# Patient Record
Sex: FEMALE | Race: WHITE
[De-identification: names, ages, dates, MRNs, and addresses within clinical notes are randomized per-mention and may not be internally consistent; named-entity substitution may affect disease eponyms.]

---

## 2020-07-18 ENCOUNTER — HOSPITAL ENCOUNTER (INPATIENT)
Dept: HOSPITAL 47 - EC | Age: 55
LOS: 1 days | Discharge: HOME | DRG: 200 | End: 2020-07-19
Attending: SURGERY | Admitting: SURGERY
Payer: COMMERCIAL

## 2020-07-18 DIAGNOSIS — S27.0XXA: Primary | ICD-10-CM

## 2020-07-18 DIAGNOSIS — R07.89: ICD-10-CM

## 2020-07-18 DIAGNOSIS — M54.2: ICD-10-CM

## 2020-07-18 DIAGNOSIS — F10.129: ICD-10-CM

## 2020-07-18 DIAGNOSIS — Z20.828: ICD-10-CM

## 2020-07-18 DIAGNOSIS — Z79.899: ICD-10-CM

## 2020-07-18 DIAGNOSIS — R10.9: ICD-10-CM

## 2020-07-18 DIAGNOSIS — F17.200: ICD-10-CM

## 2020-07-18 DIAGNOSIS — Z98.51: ICD-10-CM

## 2020-07-18 DIAGNOSIS — S22.42XA: ICD-10-CM
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
## 2020-07-18 LAB
ALBUMIN SERPL-MCNC: 5.3 G/DL (ref 3.5–5)
ALP SERPL-CCNC: 66 U/L (ref 38–126)
ALT SERPL-CCNC: 15 U/L (ref 4–34)
AMYLASE SERPL-CCNC: 35 U/L (ref 30–110)
ANION GAP SERPL CALC-SCNC: 11 MMOL/L
APTT BLD: 22.1 SEC (ref 22–30)
AST SERPL-CCNC: 34 U/L (ref 14–36)
BASOPHILS # BLD AUTO: 0 K/UL (ref 0–0.2)
BASOPHILS NFR BLD AUTO: 0 %
BUN SERPL-SCNC: 6 MG/DL (ref 7–17)
CALCIUM SPEC-MCNC: 9.2 MG/DL (ref 8.4–10.2)
CHLORIDE SERPL-SCNC: 100 MMOL/L (ref 98–107)
CK SERPL-CCNC: 315 U/L (ref 30–135)
CO2 SERPL-SCNC: 24 MMOL/L (ref 22–30)
EOSINOPHIL # BLD AUTO: 0.1 K/UL (ref 0–0.7)
EOSINOPHIL NFR BLD AUTO: 1 %
ERYTHROCYTE [DISTWIDTH] IN BLOOD BY AUTOMATED COUNT: 4.36 M/UL (ref 3.8–5.4)
ERYTHROCYTE [DISTWIDTH] IN BLOOD: 12.8 % (ref 11.5–15.5)
GLUCOSE BLD-MCNC: 111 MG/DL (ref 75–99)
GLUCOSE BLD-MCNC: 99 MG/DL (ref 75–99)
GLUCOSE SERPL-MCNC: 106 MG/DL (ref 74–99)
HCT VFR BLD AUTO: 43.9 % (ref 34–46)
HGB BLD-MCNC: 14.2 GM/DL (ref 11.4–16)
INR PPP: 0.9 (ref ?–1.2)
LYMPHOCYTES # SPEC AUTO: 1 K/UL (ref 1–4.8)
LYMPHOCYTES NFR SPEC AUTO: 10 %
MCH RBC QN AUTO: 32.6 PG (ref 25–35)
MCHC RBC AUTO-ENTMCNC: 32.4 G/DL (ref 31–37)
MCV RBC AUTO: 100.7 FL (ref 80–100)
MONOCYTES # BLD AUTO: 0.4 K/UL (ref 0–1)
MONOCYTES NFR BLD AUTO: 4 %
NEUTROPHILS # BLD AUTO: 8.4 K/UL (ref 1.3–7.7)
NEUTROPHILS NFR BLD AUTO: 84 %
PLATELET # BLD AUTO: 248 K/UL (ref 150–450)
POTASSIUM SERPL-SCNC: 4 MMOL/L (ref 3.5–5.1)
PROT SERPL-MCNC: 8.2 G/DL (ref 6.3–8.2)
PT BLD: 9.6 SEC (ref 9–12)
SODIUM SERPL-SCNC: 135 MMOL/L (ref 137–145)
TROPONIN I SERPL-MCNC: <0.012 NG/ML (ref 0–0.03)
WBC # BLD AUTO: 9.9 K/UL (ref 3.8–10.6)

## 2020-07-18 PROCEDURE — 93005 ELECTROCARDIOGRAM TRACING: CPT

## 2020-07-18 PROCEDURE — 82150 ASSAY OF AMYLASE: CPT

## 2020-07-18 PROCEDURE — 96376 TX/PRO/DX INJ SAME DRUG ADON: CPT

## 2020-07-18 PROCEDURE — 80320 DRUG SCREEN QUANTALCOHOLS: CPT

## 2020-07-18 PROCEDURE — 80053 COMPREHEN METABOLIC PANEL: CPT

## 2020-07-18 PROCEDURE — 99285 EMERGENCY DEPT VISIT HI MDM: CPT

## 2020-07-18 PROCEDURE — 96375 TX/PRO/DX INJ NEW DRUG ADDON: CPT

## 2020-07-18 PROCEDURE — 32551 INSERTION OF CHEST TUBE: CPT

## 2020-07-18 PROCEDURE — 96361 HYDRATE IV INFUSION ADD-ON: CPT

## 2020-07-18 PROCEDURE — 84484 ASSAY OF TROPONIN QUANT: CPT

## 2020-07-18 PROCEDURE — 86900 BLOOD TYPING SEROLOGIC ABO: CPT

## 2020-07-18 PROCEDURE — 82553 CREATINE MB FRACTION: CPT

## 2020-07-18 PROCEDURE — 70450 CT HEAD/BRAIN W/O DYE: CPT

## 2020-07-18 PROCEDURE — 85025 COMPLETE CBC W/AUTO DIFF WBC: CPT

## 2020-07-18 PROCEDURE — 83605 ASSAY OF LACTIC ACID: CPT

## 2020-07-18 PROCEDURE — 86901 BLOOD TYPING SEROLOGIC RH(D): CPT

## 2020-07-18 PROCEDURE — 85610 PROTHROMBIN TIME: CPT

## 2020-07-18 PROCEDURE — 82550 ASSAY OF CK (CPK): CPT

## 2020-07-18 PROCEDURE — 71045 X-RAY EXAM CHEST 1 VIEW: CPT

## 2020-07-18 PROCEDURE — 0W9B30Z DRAINAGE OF LEFT PLEURAL CAVITY WITH DRAINAGE DEVICE, PERCUTANEOUS APPROACH: ICD-10-PCS

## 2020-07-18 PROCEDURE — 72125 CT NECK SPINE W/O DYE: CPT

## 2020-07-18 PROCEDURE — 80048 BASIC METABOLIC PNL TOTAL CA: CPT

## 2020-07-18 PROCEDURE — 72170 X-RAY EXAM OF PELVIS: CPT

## 2020-07-18 PROCEDURE — 74177 CT ABD & PELVIS W/CONTRAST: CPT

## 2020-07-18 PROCEDURE — 83690 ASSAY OF LIPASE: CPT

## 2020-07-18 PROCEDURE — 71260 CT THORAX DX C+: CPT

## 2020-07-18 PROCEDURE — 86850 RBC ANTIBODY SCREEN: CPT

## 2020-07-18 PROCEDURE — 36415 COLL VENOUS BLD VENIPUNCTURE: CPT

## 2020-07-18 PROCEDURE — 96374 THER/PROPH/DIAG INJ IV PUSH: CPT

## 2020-07-18 PROCEDURE — 85730 THROMBOPLASTIN TIME PARTIAL: CPT

## 2020-07-18 RX ADMIN — HYDROMORPHONE HYDROCHLORIDE PRN MG: 1 INJECTION, SOLUTION INTRAMUSCULAR; INTRAVENOUS; SUBCUTANEOUS at 21:48

## 2020-07-18 RX ADMIN — KETOROLAC TROMETHAMINE SCH MG: 30 INJECTION, SOLUTION INTRAMUSCULAR at 18:05

## 2020-07-18 RX ADMIN — HYDROMORPHONE HYDROCHLORIDE PRN MG: 1 INJECTION, SOLUTION INTRAMUSCULAR; INTRAVENOUS; SUBCUTANEOUS at 09:45

## 2020-07-18 RX ADMIN — HYDROCODONE BITARTRATE AND ACETAMINOPHEN PRN EACH: 5; 325 TABLET ORAL at 11:26

## 2020-07-18 RX ADMIN — HYDROMORPHONE HYDROCHLORIDE PRN MG: 1 INJECTION, SOLUTION INTRAMUSCULAR; INTRAVENOUS; SUBCUTANEOUS at 14:22

## 2020-07-18 NOTE — XR
EXAMINATION TYPE: XR chest 1V

 

DATE OF EXAM: 7/18/2020

 

HISTORY: Chest tube.

 

REFERENCE: Previous study dated 7/18/2020.

 

FINDINGS: A left pleural drain has been inserted. Residual pneumothorax is not identified. There is s
ome atelectatic change present at the left lung base. The right lung is clear. The heart is not enlar
ged. I could not exclude a tiny left effusion.

 

IMPRESSION: 

 

LEFT-SIDED PNEUMOTHORAX APPEARS TO HAVE RESOLVED.
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
## 2020-07-18 NOTE — XR
EXAMINATION TYPE: XR pelvis AP view ,

 

DATE OF EXAM ORDERED: 7/18/2020

 

HISTORY: Trauma.

 

COMPARISON: None.

 

FINDINGS:  Osseous structures about the pelvis are normal. No fracture is seen. There are mild degene
rative changes within the hips.

 

IMPRESSION: 

 

NO ACUTE OSSEOUS LESION.
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
## 2020-07-19 VITALS — RESPIRATION RATE: 28 BRPM | DIASTOLIC BLOOD PRESSURE: 72 MMHG | SYSTOLIC BLOOD PRESSURE: 101 MMHG | HEART RATE: 75 BPM

## 2020-07-19 VITALS — TEMPERATURE: 98.5 F

## 2020-07-19 LAB
ANION GAP SERPL CALC-SCNC: 5 MMOL/L
BASOPHILS # BLD AUTO: 0 K/UL (ref 0–0.2)
BASOPHILS NFR BLD AUTO: 0 %
BUN SERPL-SCNC: 8 MG/DL (ref 7–17)
CALCIUM SPEC-MCNC: 8.9 MG/DL (ref 8.4–10.2)
CHLORIDE SERPL-SCNC: 100 MMOL/L (ref 98–107)
CO2 SERPL-SCNC: 24 MMOL/L (ref 22–30)
EOSINOPHIL # BLD AUTO: 0.1 K/UL (ref 0–0.7)
EOSINOPHIL NFR BLD AUTO: 2 %
ERYTHROCYTE [DISTWIDTH] IN BLOOD BY AUTOMATED COUNT: 3.8 M/UL (ref 3.8–5.4)
ERYTHROCYTE [DISTWIDTH] IN BLOOD: 12.7 % (ref 11.5–15.5)
GLUCOSE SERPL-MCNC: 87 MG/DL (ref 74–99)
HCT VFR BLD AUTO: 39.2 % (ref 34–46)
HGB BLD-MCNC: 12.3 GM/DL (ref 11.4–16)
LYMPHOCYTES # SPEC AUTO: 1.1 K/UL (ref 1–4.8)
LYMPHOCYTES NFR SPEC AUTO: 24 %
MCH RBC QN AUTO: 32.3 PG (ref 25–35)
MCHC RBC AUTO-ENTMCNC: 31.3 G/DL (ref 31–37)
MCV RBC AUTO: 102.9 FL (ref 80–100)
MONOCYTES # BLD AUTO: 0.4 K/UL (ref 0–1)
MONOCYTES NFR BLD AUTO: 9 %
NEUTROPHILS # BLD AUTO: 3 K/UL (ref 1.3–7.7)
NEUTROPHILS NFR BLD AUTO: 63 %
PLATELET # BLD AUTO: 171 K/UL (ref 150–450)
POTASSIUM SERPL-SCNC: 4.3 MMOL/L (ref 3.5–5.1)
SODIUM SERPL-SCNC: 129 MMOL/L (ref 137–145)
WBC # BLD AUTO: 4.7 K/UL (ref 3.8–10.6)

## 2020-07-19 RX ADMIN — HYDROCODONE BITARTRATE AND ACETAMINOPHEN PRN EACH: 5; 325 TABLET ORAL at 12:47

## 2020-07-19 RX ADMIN — KETOROLAC TROMETHAMINE SCH MG: 30 INJECTION, SOLUTION INTRAMUSCULAR at 02:00

## 2020-07-19 RX ADMIN — KETOROLAC TROMETHAMINE SCH MG: 30 INJECTION, SOLUTION INTRAMUSCULAR at 06:50

## 2020-07-19 RX ADMIN — KETOROLAC TROMETHAMINE SCH: 30 INJECTION, SOLUTION INTRAMUSCULAR at 12:47

## 2020-07-19 RX ADMIN — HYDROCODONE BITARTRATE AND ACETAMINOPHEN PRN EACH: 5; 325 TABLET ORAL at 05:12

## 2020-07-19 NOTE — XR
EXAMINATION TYPE: XR chest 1V

 

DATE OF EXAM: 7/19/2020

 

HISTORY: pneumo.

 

REFERENCE: Previous study dated 7/18/2020.

 

FINDINGS: A left pleural drain remains in place. A definite pneumothorax is not seen at this time. Th
ere has developed platelike atelectasis at the left lung base. Right lung is clear. The heart is not 
enlarged. No definite pleural fluid is seen.

 

IMPRESSION: 

1. NO DEFINITE PNEUMOTHORAX AT THIS TIME.

2. PLATELIKE ATELECTASIS, LEFT LUNG BASE.

## 2020-07-19 NOTE — P.PN
Progress Note - Text


Progress Note Date: 07/19/20


The patient feels well.  Her pneumothorax is improved.





On exam vital signs are stable.  Chest is clear abdomen soft.  Her patiently 

discharged home today.  She'll follow-up with Dr. Beltran for removal of 

thoravent.

## 2020-07-19 NOTE — P.PN
Subjective


Progress Note Date: 07/19/20





On 07/19/2020, the patient is doing well and the patient has no specific 

complaints.  The Thoravent is attached to her pleural VAC and there is no 

evidence of any air leak.  The follow-up chest x-ray from today shows expansion 

of the left lung without any major issues.  No evidence of any pneumothorax.  T

he patient is not having any respiratory difficulty speech is using incentive 

spirometer.  Chest wall pain is under good control.  Blood work essentially 

within normal limits in terms of her CBC.  Sodium is slightly low at 129.  No 

signs of any delirium tremens.  Hemodynamics is stable.  No hemoptysis.  No 

altered mentation.  No other complaints otherwise.  The patient is emanating the

patient has no complaints.





Objective





- Vital Signs


Vital signs: 


                                   Vital Signs











Temp  98.5 F   07/19/20 04:00


 


Pulse  67   07/19/20 10:00


 


Resp  12   07/19/20 10:00


 


BP  111/81   07/19/20 09:00


 


Pulse Ox  97   07/19/20 10:00








                                 Intake & Output











 07/18/20 07/19/20 07/19/20





 18:59 06:59 18:59


 


Intake Total 1080 1250 360


 


Output Total 0 2000 0


 


Balance 1080 -750 360


 


Weight 56.699 kg 60.7 kg 


 


Intake:   


 


  Oral 1080 1250 360


 


Output:   


 


  Urine 0 2000 0


 


Other:   


 


  Voiding Method  Bedside Commode Bedside Commode


 


  # Voids  1 1














- Exam





The patient appeared well nourished and normally developed. Vital signs as 

documented. Head exam is unremarkable. No scleral icterus or corneal arcus note

d. Neck is without jugular venous distension, thyromegaly, or carotid bruits. 

Carotid upstrokes are brisk bilaterally. Lungs are clear to auscultation and 

percussion.  The patient has symmetrical breath sounds bilaterally and the 

patient has a Thoravent over the left anterior chest area which is well-

positioned and well-dressed.  Cardiac exam reveals the PMI to be normally sized 

and situated. Rhythm is regular. First and second heart sounds normal. No 

murmurs, rubs or gallops. Abdominal exam reveals normal bowel sounds, no masses,

no organomegaly and no aortic enlargement. Extremities are nonedematous and both

femoral and pedal pulses are normal.





- Labs


CBC & Chem 7: 


                                 07/19/20 04:44





                                 07/19/20 04:44


Labs: 


                  Abnormal Lab Results - Last 24 Hours (Table)











  07/19/20 07/19/20 Range/Units





  04:44 04:44 


 


MCV  102.9 H   (80.0-100.0)  fL


 


Sodium   129 L  (137-145)  mmol/L


 


Creatinine   0.39 L  (0.52-1.04)  mg/dL














Assessment and Plan


Plan: 











1 traumatic left-sided pneumothorax post Thoravent insertion with excellent 

reexpansion of the left lung.  The patient continues to have the Thoravent in 

place.  Repeat chest x-ray from today shows no evidence of any pneumothorax.  No

evidence of any air leak and the Thoravent was taken off the infection.





2 nondisplaced rib fractures fifth and sixth on the left





3 chest wall pain secondary to above





4 fall





5 smoker





Plan





Norco for pain control


Provide the patient incentive spirometer 


I do not object for discharge today and the patient can go home along with the 

Thoravent.  She can see me tomorrow or Tuesday in the office for repeat chest x-

ray in the office and possibly removal of the Thoravent tube..  His hematocrit 

is stable.  I think she should be able to get discharged.  I discussed this with

the surgeon.

## 2020-09-04 ENCOUNTER — HOSPITAL ENCOUNTER (OUTPATIENT)
Dept: HOSPITAL 47 - RADMAMWWP | Age: 55
Discharge: HOME | End: 2020-09-04
Attending: FAMILY MEDICINE
Payer: COMMERCIAL

## 2020-09-04 DIAGNOSIS — Z12.31: Primary | ICD-10-CM

## 2020-09-04 PROCEDURE — 77067 SCR MAMMO BI INCL CAD: CPT

## 2020-09-08 NOTE — MM
Reason for exam: screening  (asymptomatic).



History:

Patient is postmenopausal.

Family history of breast cancer in paternal aunt.

Benign excisional biopsy of the right breast, 1995.  Benign excisional biopsy of 

the left breast.



Physical Findings:

A clinical breast exam by your physician is recommended on an annual basis and 

results should be correlated with mammographic findings.



MG Screening Mammo w CAD

Bilateral CC and MLO view(s) were taken.

No prior studies available for comparison.

The breast tissue is heterogeneously dense. This may lower the sensitivity of 

mammography.

Finding: There are intermediate concern, suspicious grouped/clustered 

calcifications in both breasts.





ASSESSMENT: Incomplete: need additional imaging evaluation, BI-RAD 0



RECOMMENDATION:

Special view mammogram of both breasts.



[facility] will attempt to contact patient to return for supplemental 

views.

## 2020-09-10 ENCOUNTER — HOSPITAL ENCOUNTER (OUTPATIENT)
Dept: HOSPITAL 47 - RADMAMWWP | Age: 55
Discharge: HOME | End: 2020-09-10
Attending: FAMILY MEDICINE
Payer: COMMERCIAL

## 2020-09-10 DIAGNOSIS — R92.8: Primary | ICD-10-CM

## 2020-09-10 PROCEDURE — 77066 DX MAMMO INCL CAD BI: CPT

## 2020-09-10 NOTE — MM
Reason for exam: additional evaluation requested from abnormal screening.

Last mammogram was performed less than 1 month ago.



History:

Patient is postmenopausal.

Family history of breast cancer.

Benign stereotactic core biopsy of the left breast, January 9, 2003.  Benign 

excisional biopsy of the right breast, 1995.  Core biopsy of the left breast.  

Benign excisional biopsy of the left breast.



Physical Findings:

Nurse did not find any significant physical abnormalities on exam.



MG Work Up Mamm w CAD BILAT

Bilateral CC with magnification, MLO with magnification, and LM view(s) were 

taken.

Prior study comparison: September 4, 2020, bilateral MG screening mammo w CAD.

The breast tissue is heterogeneously dense. This may lower the sensitivity of 

mammography.  Indeterminate group of microcalcifications outer lower right breast 

6cm from nipple and upper outer left breast 4cm from nipple.



These results were verbally communicated with the patient and result sheet given 

to the patient on 9/10/20.





ASSESSMENT: Suspicious, BI-RAD 4



RECOMMENDATION:

Stereotactic core biopsy of both breasts.



Called Dr. Wakefield's office with mammographic findings and has scheduled an 

appointment for the patient for 9/24/20 at 12:00 with Dr. Goss.

Biopsy scheduled for 9/25/20 at 8:00.



PRELIMINARY REPORT CALLED AND FAXED TO DR. GOSS ON 9/10/20.

## 2020-09-24 ENCOUNTER — HOSPITAL ENCOUNTER (OUTPATIENT)
Dept: HOSPITAL 47 - WWCWWP | Age: 55
Discharge: HOME | End: 2020-09-24
Attending: SURGERY
Payer: COMMERCIAL

## 2020-09-24 VITALS
TEMPERATURE: 98.7 F | DIASTOLIC BLOOD PRESSURE: 86 MMHG | SYSTOLIC BLOOD PRESSURE: 128 MMHG | HEART RATE: 109 BPM | RESPIRATION RATE: 16 BRPM

## 2020-09-24 DIAGNOSIS — Z53.9: Primary | ICD-10-CM
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
## 2020-09-25 ENCOUNTER — HOSPITAL ENCOUNTER (OUTPATIENT)
Dept: HOSPITAL 47 - RADMAMWWP | Age: 55
End: 2020-09-25
Attending: SURGERY
Payer: COMMERCIAL

## 2020-09-25 VITALS — HEART RATE: 93 BPM | SYSTOLIC BLOOD PRESSURE: 135 MMHG | TEMPERATURE: 98.3 F | DIASTOLIC BLOOD PRESSURE: 84 MMHG

## 2020-09-25 VITALS — RESPIRATION RATE: 12 BRPM

## 2020-09-25 DIAGNOSIS — R92.8: ICD-10-CM

## 2020-09-25 DIAGNOSIS — N60.11: Primary | ICD-10-CM

## 2020-09-25 DIAGNOSIS — L76.32: ICD-10-CM

## 2020-09-25 DIAGNOSIS — N64.89: ICD-10-CM

## 2020-09-25 PROCEDURE — 19081 BX BREAST 1ST LESION STRTCTC: CPT

## 2020-09-25 PROCEDURE — 88305 TISSUE EXAM BY PATHOLOGIST: CPT

## 2020-09-25 NOTE — P.PCN
Date of Procedure: 09/25/20


Preoperative Diagnosis: 


Microcalcifications of concern in the right breast 2 areas, left breast one 

area; stereotactic core biopsy recommended for each of these areas


Postoperative Diagnosis: 


Same


Procedure(s) Performed: 


Stereotactic core biopsy lower outer quadrant right breast


Anesthesia: local (18 mL of 1% plain lidocaine were utilized 12 of this was was 

with epinephrine)


Surgeon: Zian Goss


Pathology: other (Breast tissue)


Condition: stable


Disposition: same day


Indications for Procedure: 


Microcalcifications of concern right breast lower outer quadrant


Operative Findings: 


Calcifications of concern noted and specimen


Description of Procedure: 


This is a 54-year-old  female who on a mammogram was noted to 

have 2 areas of concern in the right breast of calcifications and one in the 

left breast.  The areas in the right breast included the lower outer quadrant 

and the upper outer quadrant.  After review with radiology it was felt that the 

lower outer quadrant was more suspicious that this area should be biopsied fi

rst.  In the left breast additional area of microcalcifications of concern were 

noted which are also to be biopsy.  Risks and benefits of stereotactic core 

biopsy were discussed with the patient and she wished to proceed.  Alternatives 

such as watchful waiting (biopsy in the operating room were not recommended.


The patient was taken to the stereotactic core biopsy room and positioned on the

stereotactic table.  A CC from below approach was utilized.  The area of concern

was identified.  This area was targeted.  The breast was prepped using Betadine.

 12 mL of 1% lidocaine was used to anesthetize the area of concern.   This was 

placed on the fact that the patient was only 125 pounds and would like to limit 

our amount of local anesthetic to approximately 28 mL.  This was calculated at 

the pharmacy.  The patient experienced increasing pain during the procedure and 

an additional 6 mL of 1% lidocaine were inserted into the area of work.  The 

area of concern was targeted.  A petite needle 9-gauge vacuum-assisted core 

rotating biopsy needle was inserted into the correct location.  The needle was 

fired.  Post films revealed that the needle was in the correct location.  24 

specimens were obtained.  Radiograph of the specimen revealed that the 

calcifications of concern had been obtained.  The area was lavaged.  The 

affluent was clear.  A trimark needle was placed.  The patient tolerated 

procedure in stable condition.  The specimen was sent to pathology.  The patient

tolerated the procedure in stable condition.





Secondary to the fact that we have a limited amount of local anesthetic a

vailable to use at this time the seminal patient's weight the other 2 areas of 

concern with the stereotactic biopsy at a later date.


Plan:


1.  Follow-up Dr. Parker next week


2.  Stereotactic core biopsy right breast upper outer quadrant


Stereotactic core biopsy left breast patient most likely be done at separate 

settings secondary to the amount of local anesthetic available for us based on 

the patient's weight.

## 2020-09-28 NOTE — MM
EXAMINATION TYPE: MG stereo VAD BX RT

 

DATE OF EXAM: 9/25/2020

 

COMPARISON: 9/10/2020

 

CLINICAL HISTORY: Abnormal bilateral breast mammogram

 

TECHNIQUE: Stereotactic guided core biopsy of right breast.  

 

FINDINGS:

Images are reviewed with the surgeon. There are 2 groups of calcifications on 
the right breast which appears suspicious. More difficult posterior 
calcifications were chosen for initial targeting. Targeting was provided by 
radiology. Procedure was performed by surgery.

 

Specimen: Calcifications are demonstrated within the mammographic specimen.

 

Following the procedure large hematoma developed. This was addressed by the 
surgeon. Good hemostasis was obtained prior to release from the department.

 

IMPRESSION:

1. Successful stereotactic core biopsy 1 location right breast.

 

Recommendations:

1. Recommendations are pending pathology results.

2. There are 2 sites remaining.

3. Hematoma at the initial biopsy site

 

Pathology Results: Benign

 

A. RIGHT BREAST, LOWER OUTER QUADRANT LESION, NEEDLE CORE BIOPSIES: Benign 
fibrofatty breast parenchyma with fibrocystic spectrum lesions and coarse 
intraductal mineralizations. 



B. RIGHT BREAST, LOWER OUTER QUADRANT LESION, NEEDLE CORE BIOPSIES: Fibrofatty 
breast parenchyma with fibrin and some cores showing fibrocystic spectrum breast
lesions.



Recommendation

Follow up mammogram of the right breast in 6 months.

ALEISHA

## 2020-10-08 ENCOUNTER — HOSPITAL ENCOUNTER (OUTPATIENT)
Dept: HOSPITAL 47 - RADMAMWWP | Age: 55
End: 2020-10-08
Attending: SURGERY
Payer: COMMERCIAL

## 2020-10-08 VITALS — HEART RATE: 77 BPM | SYSTOLIC BLOOD PRESSURE: 126 MMHG | DIASTOLIC BLOOD PRESSURE: 80 MMHG

## 2020-10-08 VITALS — TEMPERATURE: 98.2 F | RESPIRATION RATE: 16 BRPM

## 2020-10-08 DIAGNOSIS — N60.12: Primary | ICD-10-CM

## 2020-10-08 DIAGNOSIS — N60.22: ICD-10-CM

## 2020-10-08 PROCEDURE — 19081 BX BREAST 1ST LESION STRTCTC: CPT

## 2020-10-08 PROCEDURE — 88305 TISSUE EXAM BY PATHOLOGIST: CPT

## 2020-10-08 NOTE — MM
EXAMINATION TYPE: MG stereo VAD BX LT

 

DATE OF EXAM: 10/8/2020

 

COMPARISON: 10/2/2020, 9/10/2020

 

CLINICAL HISTORY: Previous abnormal mammogram

 

TECHNIQUE: Stereotactic guided core biopsy of left breast.  

 

FINDINGS: The procedure of stereotactic guided core biopsy was explained to the 
patient.  Benefits, alternatives, and risks were discussed.  An informed consent
was then obtained.  

 

[facility]. Surgery performed the localization 
and procedure.

 

Specimen: Calcifications are within the specimen.

 

Postprocedure mammogram: Stereotactic core marker is in the expected location of
the calcifications.

 

IMPRESSION: 

1. Successful stereotactic core biopsy left breast calcifications.

 

Recommendations:

1. Recommendations are pending pathology results.

 

Pathology Results: Benign



LEFT BREAST, NEEDLE CORE BIOPSIES:  Fibrocystic spectrum changes with adenosis 
and multifocal coarse intraductal mineralizations.  



Recommendation

Follow up mammogram of the left breast in 6 months.

NICOLED

## 2020-10-29 ENCOUNTER — HOSPITAL ENCOUNTER (OUTPATIENT)
Dept: HOSPITAL 47 - WWCWWP | Age: 55
Discharge: HOME | End: 2020-10-29
Attending: SURGERY
Payer: COMMERCIAL

## 2020-10-29 VITALS
SYSTOLIC BLOOD PRESSURE: 134 MMHG | HEART RATE: 89 BPM | DIASTOLIC BLOOD PRESSURE: 88 MMHG | TEMPERATURE: 98.2 F | RESPIRATION RATE: 18 BRPM

## 2020-10-29 DIAGNOSIS — Z53.9: Primary | ICD-10-CM

## 2020-10-29 NOTE — P.PN
Subjective


Progress Note Date: 10/29/20


Principal diagnosis: 





fibrocystic breast changes





     Beth is a 55 year old female who underwent a stereotactic core biopsy and 

[id number].  Pathology revealed fibrocystic changes and repeat left breast 

mammogram in 6 months is recommended.  Multifocal coarse calcifications were not

ed in the specimen.  The patient has no complaints related to the procedure.


     She is status post right breast stereotactic core biopsy and [id number].  

This was benign as well.








Objective





- Vital Signs


Vital signs: 


                                   Vital Signs











Temp  98.2 F   10/29/20 08:54


 


Pulse  89   10/29/20 08:54


 


Resp  18   10/29/20 08:54


 


BP  134/88   10/29/20 08:54


 


Pulse Ox  98   10/29/20 08:54








                                 Intake & Output











 10/28/20 10/29/20 10/29/20





 18:59 06:59 18:59


 


Weight   56.699 kg














- Exam





BMI 22.1





- Constitutional


General appearance: Present: average body habitus





- Respiratory


Respiratory: bilateral: CTA





- Cardiovascular


Rhythm: regular


Heart sounds: normal: S1, S2





- Integumentary


Integumentary Comment(s): 





Bilateral ecchymosis of both breast at the biopsy sites


Bilateral hematomas at biopsy sites


No evidence of infection





Assessment and Plan


Assessment: 





Impression:


1.  Bilateral stereotactic core biopsies of the breast benign





Plan:


1.  Bilateral mammograms in 6 months with physician exam at that time





CC: DR. Wakefield 





encounter 15 minutes, > 50% of time in planning and counselling